# Patient Record
(demographics unavailable — no encounter records)

---

## 2025-03-31 NOTE — PHYSICAL EXAM
[de-identified] : Patient is WDWN, alert, and in no acute distress. Breathing is unlabored. She is grossly oriented to person, place, and time.  Right Hand -  Inspection/Palpation: Well healed thenar scar. No tenderness. Mild edema. Stability: No joint instability on provocative testing. Range of Motion: Full  Sensation: Normal [de-identified] : AP, lateral and oblique views of the right hand revealed a healed base of thumb metacarpal .Mild thumb CMC arthritis noted.  No residual hardware present.  Left forearm Xrays 2views reveal a fully healed anatomically aligned radial shaft fracture. Plate and screws in place.

## 2025-03-31 NOTE — ADDENDUM
[FreeTextEntry1] : I, Brigida Kumar wrote this note acting as a scribe for Dr. Medhat Clemente on 03/31/2025.   All medical record entries made by the Scribe were at my, Dr. Medhat Clemente MD., direction and personally dictated by me on 03/31/2025. I have personally reviewed the chart and agree that the record accurately reflects my personal performance of the history, physical exam, assessment and plan.

## 2025-03-31 NOTE — HISTORY OF PRESENT ILLNESS
[___ Weeks Post Op] : [unfilled] weeks post op [Clean/Dry/Intact] : clean, dry and intact [Healed] : healed [Doing Well] : is doing well [No Sign of Infection] : is showing no signs of infection [Adequate Pain Control] : has adequate pain control [None] : None [de-identified] : S/p ORIF Right Conte and left radial shaft  fracture.   [de-identified] : Patient is WDWN, alert, and in no acute distress. Breathing is unlabored. She is grossly oriented to person, place, and time.   Right thumb:  No sign of infection. Fully healed.   Left forearm:  Incision is healed. There are no signs of infection. Full ROM. Full extension and flexion, pronation and supination. [de-identified] : AP, lateral and oblique views of the right hand were obtained today and revealed improved alignment of the base of thumb metacarpal fracture. Fx is fully healed.   AP, lateral  views of the left forearm were obtained today and revealed a healed and anatomically aligned radial shaft fracture with a volar DCP ,multiple screws through the plate and one lag screw outside of the plate., no signs of hardware displacement. [de-identified] : The underlying pathophysiology was reviewed with the patient. XR films were reviewed with the patient. Discussed at length the nature of the patient's condition.   Activity modifications were reviewed with the patient at length.  Massage the scar with vitamin E oil   Follow-up in 6 months. [de-identified] : The patient is a 36 year female who returns for follow up visit after undergoing ORIF Right Conte and left radial shaft fracture at [facility] on 07/19/2024. Since patient's visit in October she reports feeling well.  Patient was participating in home PT exercises last year, since the new year she has not been doing any home PT. She states her right thumb and left forearm for the most part feel back to baseline. Patient states regarding the right thumb, she notes a constant soreness to the posterior aspect of the thumb particularly when flexing the finger. She denies any weakness, numbness, tingling, trauma, bruising, swelling, redness of the thumb. Regarding the left forearm, she notes occasional soreness to the surgical site but otherwise denies pain. She notes intermittent clicking when she pronates her forearm otherwise patient is without pain, bruising, swelling, redness, weakness, numbness, tingling. the 3rd postoperative visit after undergoing ORIF Right Conte and left radial shaft fracture at [facility]. The surgery was on 07/19/2024. The patient has returned to work as a Phys .    Today, 03/31/2025, the patient presents for a follow-up evaluation and further care. She reports mild soreness and clicking with pronation. She denies taking pain medication.  She has returned to all activities. She denies taking pain meds.

## 2025-03-31 NOTE — DISCUSSION/SUMMARY
[de-identified] :  The underlying pathophysiology was reviewed with the patient. XR films were reviewed with the patient. Discussed at length the nature of the patients condition. The right symptoms are secondary to right Conte fracture and left radial shaft fracture.  Gentle range of motion, stretching, and strengthening exercises were encouraged. Patient may continue participating in all physical activities without restrictions, as tolerated. All questions answered, understanding verbalized. Patient in agreement with plan of care. The patient can follow-up in 4 months or as needed. If the patient begins to experience any changes or severe exacerbation of her symptoms, she should reach out to me as soon as possible.